# Patient Record
Sex: FEMALE | Race: WHITE
[De-identification: names, ages, dates, MRNs, and addresses within clinical notes are randomized per-mention and may not be internally consistent; named-entity substitution may affect disease eponyms.]

---

## 2017-05-21 ENCOUNTER — HOSPITAL ENCOUNTER (EMERGENCY)
Dept: HOSPITAL 25 - ED | Age: 82
Discharge: HOME | End: 2017-05-21
Payer: MEDICARE

## 2017-05-21 VITALS — SYSTOLIC BLOOD PRESSURE: 165 MMHG | DIASTOLIC BLOOD PRESSURE: 66 MMHG
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
## 2017-05-21 DIAGNOSIS — S20.361A: Primary | ICD-10-CM
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
## 2017-05-21 PROCEDURE — 99281 EMR DPT VST MAYX REQ PHY/QHP: CPT

## 2017-05-21 NOTE — ED
Bite Injury/Animal





- HPI Summary


HPI Summary: 


Patient presents with part of a tick in her right rib area. She and her partner 

tried to get it out, but he felt like it only came out in pieces. She thinks it 

has only been there since yesterday. No other symptoms.





- History of Current Complaint


Chief Complaint: EDAnimalBite


Stated Complaint: TICK ON BACK


Time Seen by Provider: 05/21/17 20:27


Hx Obtained From: Patient


Onset of Injury: Still Present


Type of Bite: Wild Animal - tick


Severity Initially: Mild


Severity Currently: Mild


Pain Intensity: 0


Character: Puncture


Aggravating Factor(s): Nothing


Alleviating Factor(s): Nothing


Associated Signs And Symptoms: Positive: Erythema - mild





PMH/Surg Hx/FS Hx/Imm Hx


Previously Healthy: Yes


Infectious Disease History: No


Infectious Disease History: 


   Denies: Traveled Outside the [place] in Last 30 Days





- Family History


Known Family History: Positive: None





- Social History


Occupation: Retired


Lives: With Family


Alcohol Use: None


Substance Use Type: Reports: None


Smoking Status (MU): Never Smoked Tobacco





Review of Systems


Negative: Edema


Positive: Other - pea size area of erythema


All Other Systems Reviewed And Are Negative: Yes





Physical Exam


Triage Information Reviewed: Yes


Vital Signs On Initial Exam: 


 Initial Vitals











Temp Pulse Resp BP Pulse Ox


 


 98.1 F   74   18   165/66   100 


 


 05/21/17 19:08  05/21/17 19:08  05/21/17 19:08  05/21/17 19:08  05/21/17 19:08











Vital Signs Reviewed: Yes


Appearance: Positive: Well-Appearing, No Pain Distress, Well-Nourished


Skin: Positive: Warm, Skin Color Reflects Adequate Perfusion, Dry, Soft, 

Erythema @ - pea size area of erythema with a .5mm black foreign body in the 

center


Head/Face: Positive: Normal Head/Face Inspection


Eyes: Positive: EOMI, CLAUDIA, Conjunctiva Clear


ENT: Positive: Hearing grossly normal


Respiratory/Lung Sounds: Positive: Breath Sounds Present


Cardiovascular: Positive: RRR


Musculoskeletal: Negative: Edema Left, Edema Right


Neurological: Positive: Sensory/Motor Intact, Alert, Oriented to Person Place, 

Time, NV Bundle Intact Distally, Normal Gait


Psychiatric: Positive: Affect/Mood Appropriate


AVPU Assessment: Alert





Diagnostics





- Vital Signs


 Vital Signs











  Temp Pulse Resp BP Pulse Ox


 


 05/21/17 19:08  98.1 F  74  18  165/66  100














- Laboratory


Lab Statement: Any lab studies that have been ordered have been reviewed, and 

results considered in the medical decision making process.





Bite Injury Course/Dx





- Diagnoses


Differential Diagnosis/HQI/PQRI: Positive: Cellulitis, Envenomation, Puncture, 

Superficial Infection


Provider Diagnosis: 


 Tick bite








Discharge





- Discharge Plan


Condition: Stable


Disposition: HOME


Patient Education Materials:  Tick Bite (ED)


Referrals: 


Rico Robbins MD [Primary Care Provider] - 


Additional Instructions: 


Please follow-up with your primary care provider as scheduled. Return to the 

emergency department if symptoms worsen.

## 2018-01-16 ENCOUNTER — HOSPITAL ENCOUNTER (EMERGENCY)
Dept: HOSPITAL 25 - ED | Age: 83
Discharge: HOME | End: 2018-01-16
Payer: MEDICARE

## 2018-01-16 VITALS — SYSTOLIC BLOOD PRESSURE: 158 MMHG | DIASTOLIC BLOOD PRESSURE: 87 MMHG

## 2018-01-16 DIAGNOSIS — S61.213A: ICD-10-CM
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
## 2018-01-16 DIAGNOSIS — Z23: ICD-10-CM

## 2018-01-16 DIAGNOSIS — S61.211A: Primary | ICD-10-CM
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
## 2018-01-16 DIAGNOSIS — S61.215A: ICD-10-CM

## 2018-01-16 DIAGNOSIS — Z88.8: ICD-10-CM

## 2018-01-16 LAB
HCT VFR BLD AUTO: 39 % (ref 35–47)
HGB BLD-MCNC: 13.2 G/DL (ref 12–16)
INR PPP/BLD: 0.87 (ref 0.77–1.02)
MCH RBC QN AUTO: 30 PG (ref 27–31)
MCHC RBC AUTO-ENTMCNC: 34 G/DL (ref 31–36)
MCV RBC AUTO: 87 FL (ref 80–97)
PLATELET # BLD AUTO: 222 10^3/UL (ref 150–450)
RBC # BLD AUTO: 4.47 10^6/UL (ref 4–5.4)
WBC # BLD AUTO: 8.2 10^3/UL (ref 3.5–10.8)

## 2018-01-16 PROCEDURE — 12001 RPR S/N/AX/GEN/TRNK 2.5CM/<: CPT

## 2018-01-16 PROCEDURE — 96365 THER/PROPH/DIAG IV INF INIT: CPT

## 2018-01-16 PROCEDURE — 96375 TX/PRO/DX INJ NEW DRUG ADDON: CPT

## 2018-01-16 PROCEDURE — 85730 THROMBOPLASTIN TIME PARTIAL: CPT

## 2018-01-16 PROCEDURE — 90715 TDAP VACCINE 7 YRS/> IM: CPT

## 2018-01-16 PROCEDURE — 85027 COMPLETE CBC AUTOMATED: CPT

## 2018-01-16 PROCEDURE — 85610 PROTHROMBIN TIME: CPT

## 2018-01-16 PROCEDURE — 36415 COLL VENOUS BLD VENIPUNCTURE: CPT

## 2018-01-16 PROCEDURE — 80053 COMPREHEN METABOLIC PANEL: CPT

## 2018-01-16 PROCEDURE — 96361 HYDRATE IV INFUSION ADD-ON: CPT

## 2018-01-16 PROCEDURE — 99283 EMERGENCY DEPT VISIT LOW MDM: CPT

## 2018-01-16 PROCEDURE — 96376 TX/PRO/DX INJ SAME DRUG ADON: CPT

## 2018-01-16 NOTE — ED
Progress





- Progress Note


Progress Note: 


Procedure for multiple laceration closure of distal tip/pad injuries of  Lt 

index, middle and ring fingers.





MEDIAN N. BLOCK performed BY DR. BO - PARTIALLY EFFECTIVE (THUMB IS 

COMPLETELY NUMB); Pt tolerated warm water/hibaclens solution soak with 

agitation w/o pain and sharp/dull testing performed and perceived to be 

effective. Initiated closure of wound of index finger but pt reported pain. Re-

evaluated digits and observed INDEX, MIDDLE AND RING FINGERS only ARTIALLY 

NUMB. Additional INDEX FINGER digital BLOCK W/ 1% LIDOCAINE - successful so 

initiated closure however again upon further closure, pt reports pain with 

sutures so procedure was interrupted and signed out to Fátima Resendiz PA-C as time 

required for closure would be lengthy.  





Lt index finger distal tip


no nerves, vessels, tendons nor debris observed upon inspection - 

hemodynamically stable


Length: 3cm x 5mm x 2.5mm; irregular/jagged edges, partial dermis missing - 

subcutaneous tissue extruding


Sutures: #3 5-0 NYLON SUTURES PLACED - wound requires further closure


Despite inadequate anesthesia in some places, pt tolerated procedure well








Course/Dx





- Diagnoses


Provider Diagnoses: 


 Laceration of multiple sites of left hand and fingers

## 2018-01-16 NOTE — PN
Progress Note





- Progress Note


Date of Service: 18


Note: 


patient signed out by Santa FISCHER pending completion of laceration closure by Fátima FISCHER





cleaned with 100cc additional saline


performed digitial block with lidocaine 1% of 3rd and 4th finger due to median 

nerve block not working


prolene 4-0


single layer closer


index finger: 3cm by 1/2cm by 1/4cm deep, irregular, placed 8 sutures total: 3 

by Santa and 5 by me


middle finger: 3cm flap like laceration placed 5 sutures


rincm flap like placed 2 sutures


placed xeroform, telfa and coband

## 2018-01-16 NOTE — RAD
HISTORY: Left hand laceration



COMPARISONS: None



VIEWS: 2, Frontal and lateral views of the left hand



FINDINGS:



BONE DENSITY: There is diffuse osteopenia.

BONES: There is no displaced fracture.    

JOINTS: There is osteoarthritis of the interphalangeal joints.    

ALIGNMENT: There is no dislocation. 

SOFT TISSUES: There is soft tissue irregularity consistent with the history of laceration

along the distal second digit.



OTHER FINDINGS: None.



IMPRESSION: 

1.  OSTEOPENIA.

2.  OSTEOARTHRITIS.

3.  SOFT TISSUE INJURY CONSISTENT WITH HISTORY OF LACERATION.

4.  NO ACUTE OSSEOUS INJURY. IF SYMPTOMS PERSIST, RECOMMEND REPEAT IMAGING

## 2018-01-19 NOTE — ED
Lynne QUIROZ Gabriel, scribed for Mckinley Lujan MD on 01/16/18 at 1435 .





Upper Extremity Pain





- HPI Summary


HPI Summary: 





This patient is a 82 year old F presenting to [facility] accompanied by her  

with a chief complaint of finger laceration on the left hand since 16:20. 

Patient states she was ripping a board when it kicked and she cut her fingers 

on the table saw.  Patient reports dizziness and nausea. Patient states she is 

in shock and cant feel any pain yet. She is requesting pain medication before 

the pain sets in. NKDA and last tetanus was approximately 10 years ago. 





- History of Current Complaint


Chief Complaint: EDExtremityUpper


Stated Complaint: LEFT HAND LAC


Time Seen by Provider: 01/16/18 14:33


Hx Obtained From: Patient


Mechanism Of Injury: Penetrating Trauma


Onset/Duration: Still Present


Timing: Constant


Severity Initially: Severe


Severity Currently: Severe


Pain Location: Finger


Associated Signs & Symptoms: Positive: Negative





- Allergies/Home Medications


Allergies/Adverse Reactions: 


 Allergies











Allergy/AdvReac Type Severity Reaction Status Date / Time


 


Iodine Allergy  Unknown Verified 01/16/18 16:46





   Reaction  





   Details  














PMH/Surg Hx/FS Hx/Imm Hx


Endocrine/Hematology History: 


   Denies: Hx Anticoagulant Therapy, Hx Blood Disorders, Hx Bone Marrow Disease

, Hx Diabetes


Cardiovascular History: 


   Denies: Hx Aneurysm, Hx Angina, Hx Deep Vein Thrombosis


Respiratory History: 


   Denies: Hx Bronchopulmonary Dysplasia, Hx Chronic Obstructive Pulmonary 

Disease (COPD)


Neurological History: 


   Denies: Hx CVA, Hx Dementia





- Surgical History


Surgery Procedure, Year, and Place: 3 back surgeries and 2 hand





- Family History


Known Family History: 


   Negative: Cardiac Disease, Hypertension, Diabetes, Renal Disease, 

Respiratory Disease, Seizure Disorder, Blood Disorder





- Social History


Lives: With Family


Alcohol Use: None


Substance Use Type: Reports: None


Smoking Status (MU): Never Smoked Tobacco





Review of Systems


Negative: Fever, Chills


Negative: Erythema


Negative: Sore Throat


Negative: Chest Pain


Negative: Shortness Of Breath, Cough


Negative: Abdominal Pain, Vomiting, Nausea


Negative: dysuria, hematuria


Negative: Myalgia, Edema


Positive: Other - laceration on left hand .  Negative: Rash


Neurological: Negative - dizziness 


All Other Systems Reviewed And Are Negative: Yes





Physical Exam





- Summary


Physical Exam Summary: 





Constitutional: Well-developed, Well-nourished, Alert. (-) Distressed


Skin: there is a cut across the tip of the index, middle, and ring finger on 

the radial and ulnar aspect of all three fingers. There is no significant 

bleeding 


HENT: Normocephalic; Atraumatic


Eyes: Conjunctiva normal


Neck: Musculoskeletal ROM normal neck. (-) JVD, (-) Stridor, (-) Tracheal 

deviation


Cardio: Rhythm regular, rate normal, Heart sounds normal; Intact distal pulses; 

The pedal pulses are 2+ and symmetric. Radial pulses are 2+ and symmetric. (-) 

Murmur


Pulmonary/Chest wall: Effort normal. (-) Respiratory distress, (-) Wheezes, (-) 

Rales


Abd: Soft, (-) Tenderness, (-) Distension, (-) Guarding, (-) Rebound


Musculoskeletal: (-) Edema


Lymph: (-) Cervical adenopathy


Neuro: Alert, Oriented x3


Psych: Mood and affect Normal


 





Triage Information Reviewed: Yes


Vital Signs Reviewed: Yes





Procedures





- Procedure Summary


Procedure Summary: 





A median nerve block was performed under US guidance with  3ml of 1% lido ,

sterile technique was used. 





Diagnostics





- Laboratory


Result Diagrams: 


 01/16/18 14:37





 01/16/18 14:37


Lab Statement: Any lab studies that have been ordered have been reviewed, and 

results considered in the medical decision making process.





- Radiology


  ** hand XRay


Radiology Interpretation Completed By: Radiologist - 1.  OSTEOPENIA. 2.  

OSTEOARTHRITIS. 3.  SOFT TISSUE INJURY CONSISTENT WITH HISTORY OF LACERATION. 

4.  NO ACUTE OSSEOUS INJURY. IF SYMPTOMS PERSIST, RECOMMEND REPEAT IMAGING ED 

physician has reviewed this radiology report.





Course/Dx





- Course


Assessment/Plan: This patient is a 82 year old F presenting to [facility] 

accompanied by her  with a chief complaint of finger laceration on the 

left hand since 16:20. Patient states she was ripping a board when it kicked 

and she cut her fingers on the table saw.  Patient reports dizziness and 

nausea. Patient states she is in shock and cant feel any pain yet. She is 

requesting pain medication before the pain sets in. NKDA and last tetanus was 

approximately 10 years ago.  Hand Xr reveals, per radiologist, 1.  OSTEOPENIA.  

2.  OSTEOARTHRITIS.  3.  SOFT TISSUE INJURY CONSISTENT WITH HISTORY OF 

LACERATION.  4.  NO ACUTE OSSEOUS INJURY. IF SYMPTOMS PERSIST, RECOMMEND REPEAT 

IMAGING.  Test results with no significant abnormalities.  In the ED course the 

patient was given Zofran, morphine, and a tetanus shot.  We discussed patient 

care with Dr. Muñoz and they recommended irrigation and follow up in his 

office in 48 hours. He suggested cephalexin for antibiotics.  Patient will be 

discharged with prescription for Keflex and Ultram and follow up from Dr. Muñoz.  The patient is agreeable with this plan.





- Diagnoses


Provider Diagnoses: 


 Laceration of multiple sites of left hand and fingers








- Physician Notifications


Discussed Care of Patient With: Benigno Muñoz


Time Discussed With Above Provider: 16:04


Instructed by Provider To: Other - We discussed patient care with Dr. Muñoz 

and they recommended irrigation and follow up in his office in 48 hours. He 

suggested cephalexin for antibiotics.





Discharge





- Discharge Plan


Condition: Stable


Disposition: HOME


Prescriptions: 


Cephalexin CAP* [Keflex CAP*] 500 mg PO QID #28 cap


traMADol TAB* [Ultram*] 50 mg PO Q6HR PRN #20 tab MDD 4


 PRN Reason: Pain Scale 6-10


Patient Education Materials:  Cephalexin (By mouth), Tramadol (By mouth), 

Finger Laceration (ED)


Referrals: 


Rico Robbins MD [Primary Care Provider] - 


Benigno Muñoz MD [Medical Doctor] - 3 Days


Additional Instructions: 


Call doctor Muñoz's office tomorrow to make an appointment for Friday the 

16th.RETURN TO THE EMERGENCY DEPARTMENT FOR CHANGING OR WORSENING SYMPTOMS 





The documentation as recorded by the Lynne brown Gabriel accurately reflects 

the service I personally performed and the decisions made by me, Mckinley Lujan MD.